# Patient Record
Sex: MALE | Race: WHITE | NOT HISPANIC OR LATINO | Employment: OTHER | URBAN - NONMETROPOLITAN AREA
[De-identification: names, ages, dates, MRNs, and addresses within clinical notes are randomized per-mention and may not be internally consistent; named-entity substitution may affect disease eponyms.]

---

## 2021-08-29 ENCOUNTER — OUTSIDE FACILITY SERVICE (OUTPATIENT)
Dept: CARDIOLOGY | Facility: CLINIC | Age: 41
End: 2021-08-29

## 2021-08-29 PROCEDURE — 93306 TTE W/DOPPLER COMPLETE: CPT | Performed by: INTERNAL MEDICINE

## 2021-11-10 PROBLEM — I25.10 CORONARY ARTERY DISEASE DUE TO CALCIFIED CORONARY LESION: Status: ACTIVE | Noted: 2021-11-10

## 2021-11-10 PROBLEM — R07.2 PRECORDIAL PAIN: Status: ACTIVE | Noted: 2021-11-10

## 2021-11-10 PROBLEM — I47.20 V-TACH (HCC): Status: ACTIVE | Noted: 2021-11-10

## 2021-11-10 PROBLEM — F17.200 SMOKER: Status: ACTIVE | Noted: 2021-11-10

## 2021-11-10 PROBLEM — I21.3 STEMI (ST ELEVATION MYOCARDIAL INFARCTION) (HCC): Status: ACTIVE | Noted: 2021-11-10

## 2021-11-10 PROBLEM — I25.84 CORONARY ARTERY DISEASE DUE TO CALCIFIED CORONARY LESION: Status: ACTIVE | Noted: 2021-11-10

## 2021-11-10 RX ORDER — METOPROLOL SUCCINATE 25 MG/1
25 TABLET, EXTENDED RELEASE ORAL DAILY
COMMUNITY
End: 2021-11-22 | Stop reason: SDUPTHER

## 2021-11-10 RX ORDER — LOSARTAN POTASSIUM 25 MG/1
25 TABLET ORAL DAILY
COMMUNITY
End: 2021-11-22 | Stop reason: SDUPTHER

## 2021-11-10 RX ORDER — BUPRENORPHINE HYDROCHLORIDE AND NALOXONE HYDROCHLORIDE DIHYDRATE 8; 2 MG/1; MG/1
1 TABLET SUBLINGUAL DAILY
COMMUNITY

## 2021-11-10 RX ORDER — ROSUVASTATIN CALCIUM 40 MG/1
40 TABLET, COATED ORAL DAILY
COMMUNITY
End: 2021-11-22 | Stop reason: SDUPTHER

## 2021-11-11 ENCOUNTER — OFFICE VISIT (OUTPATIENT)
Dept: CARDIOLOGY | Facility: CLINIC | Age: 41
End: 2021-11-11

## 2021-11-11 VITALS
BODY MASS INDEX: 23.13 KG/M2 | HEIGHT: 70 IN | DIASTOLIC BLOOD PRESSURE: 79 MMHG | SYSTOLIC BLOOD PRESSURE: 136 MMHG | OXYGEN SATURATION: 98 % | HEART RATE: 70 BPM | WEIGHT: 161.6 LBS

## 2021-11-11 DIAGNOSIS — R07.2 PRECORDIAL PAIN: ICD-10-CM

## 2021-11-11 DIAGNOSIS — I21.11 ST ELEVATION MYOCARDIAL INFARCTION INVOLVING RIGHT CORONARY ARTERY (HCC): ICD-10-CM

## 2021-11-11 DIAGNOSIS — I47.20 V-TACH (HCC): Primary | ICD-10-CM

## 2021-11-11 DIAGNOSIS — I25.84 CORONARY ARTERY DISEASE DUE TO CALCIFIED CORONARY LESION: ICD-10-CM

## 2021-11-11 DIAGNOSIS — Z87.891 FORMER SMOKER: ICD-10-CM

## 2021-11-11 DIAGNOSIS — E78.2 MIXED HYPERLIPIDEMIA: ICD-10-CM

## 2021-11-11 DIAGNOSIS — I25.10 CORONARY ARTERY DISEASE DUE TO CALCIFIED CORONARY LESION: ICD-10-CM

## 2021-11-11 PROBLEM — F17.200 SMOKER: Status: RESOLVED | Noted: 2021-11-10 | Resolved: 2021-11-11

## 2021-11-11 PROCEDURE — 99204 OFFICE O/P NEW MOD 45 MIN: CPT | Performed by: INTERNAL MEDICINE

## 2021-11-11 RX ORDER — NITROGLYCERIN 0.4 MG/1
TABLET SUBLINGUAL
COMMUNITY
Start: 2021-09-03

## 2021-11-22 ENCOUNTER — TELEPHONE (OUTPATIENT)
Dept: CARDIOLOGY | Facility: CLINIC | Age: 41
End: 2021-11-22

## 2021-11-22 DIAGNOSIS — I47.20 V-TACH (HCC): Primary | ICD-10-CM

## 2021-11-22 RX ORDER — LOSARTAN POTASSIUM 25 MG/1
25 TABLET ORAL DAILY
Qty: 30 TABLET | Refills: 5 | Status: SHIPPED | OUTPATIENT
Start: 2021-11-22 | End: 2022-05-16

## 2021-11-22 RX ORDER — METOPROLOL SUCCINATE 25 MG/1
25 TABLET, EXTENDED RELEASE ORAL DAILY
Qty: 30 TABLET | Refills: 5 | Status: SHIPPED | OUTPATIENT
Start: 2021-11-22 | End: 2021-11-22 | Stop reason: SDUPTHER

## 2021-11-22 RX ORDER — METOPROLOL SUCCINATE 25 MG/1
25 TABLET, EXTENDED RELEASE ORAL DAILY
Qty: 30 TABLET | Refills: 5 | Status: SHIPPED | OUTPATIENT
Start: 2021-11-22 | End: 2022-05-16

## 2021-11-22 RX ORDER — ROSUVASTATIN CALCIUM 40 MG/1
40 TABLET, COATED ORAL DAILY
Qty: 30 TABLET | Refills: 5 | Status: SHIPPED | OUTPATIENT
Start: 2021-11-22 | End: 2022-05-16

## 2021-11-22 NOTE — TELEPHONE ENCOUNTER
Event monitor from Dr. Arce's office reviewed by Dr. Ba. Long episode of NSVT. V/o per Dr. Ba to increase Metoprolol succ. From 12.5 mg daily to 25 mg daily and keep f/u appt. Called and discussed above with Mr. Reich and verbalized he understood. [facility]. PH,LPN

## 2021-11-22 NOTE — TELEPHONE ENCOUNTER
Pt called needing rf on all meds (Cardiac)  Pt was initially prescribed these from another doctor. Are these okay to refill?

## 2021-12-14 ENCOUNTER — OFFICE VISIT (OUTPATIENT)
Dept: CARDIOLOGY | Facility: CLINIC | Age: 41
End: 2021-12-14

## 2021-12-14 VITALS
BODY MASS INDEX: 23.22 KG/M2 | SYSTOLIC BLOOD PRESSURE: 125 MMHG | HEIGHT: 70 IN | WEIGHT: 162.2 LBS | HEART RATE: 58 BPM | OXYGEN SATURATION: 100 % | DIASTOLIC BLOOD PRESSURE: 78 MMHG

## 2021-12-14 DIAGNOSIS — R07.2 PRECORDIAL PAIN: ICD-10-CM

## 2021-12-14 DIAGNOSIS — R00.2 PALPITATIONS: ICD-10-CM

## 2021-12-14 DIAGNOSIS — I25.84 CORONARY ARTERY DISEASE DUE TO CALCIFIED CORONARY LESION: ICD-10-CM

## 2021-12-14 DIAGNOSIS — I21.11 ST ELEVATION MYOCARDIAL INFARCTION INVOLVING RIGHT CORONARY ARTERY (HCC): ICD-10-CM

## 2021-12-14 DIAGNOSIS — Z87.891 FORMER SMOKER: ICD-10-CM

## 2021-12-14 DIAGNOSIS — Z72.0 SMOKELESS TOBACCO USE: ICD-10-CM

## 2021-12-14 DIAGNOSIS — I25.10 CORONARY ARTERY DISEASE DUE TO CALCIFIED CORONARY LESION: ICD-10-CM

## 2021-12-14 DIAGNOSIS — I47.20 V-TACH (HCC): Primary | ICD-10-CM

## 2021-12-14 PROCEDURE — 99213 OFFICE O/P EST LOW 20 MIN: CPT | Performed by: INTERNAL MEDICINE

## 2021-12-14 NOTE — PROGRESS NOTES
Subjective   Hilario Reich is a 41 y.o. male     Chief Complaint   Patient presents with   • Follow-up     Here for 1 mo. f/u and ER f/u   • Chest Pain   • Coronary Artery Disease   • Rapid Heart Rate     HX VT       PROBLEM LIST:     1. CAD  1.1 [facility], 8-, setting of STEMI. Thrombectomy and 3.5 x 22 mm HASEEB placed to mid RCA  2. V-Tach per above hosp. Stay. Requiring Lidocaine drip  3. Echo, 8-, EF 50%, trace-mild MR, mild PI  4. Hyperlipidemia  5. Former smoker    Specialty Problems        Cardiology Problems    Coronary artery disease due to calcified coronary lesion        STEMI (ST elevation myocardial infarction) (HCC)        V-tach ([facility])                HPI:  Harvey Reich returns for follow-up.    Shortly after last being seen here he had an episode of palpitations, a sense of waves of weakness and lightheadedness, and mild chest discomfort.  He was seen in the emergency room at [facility] where he ruled out for myocardial infarction with serial enzymes and EKGs.  Potassium was 3.2 at that time.  The patient was given potassium supplementation and he has had no recurrence of symptoms.    Currently Mr. Reich is able to perform all of his required and desired activities without limitation.  In fact, he states that he has less shortness of breath and less fatigability in comparison to his functional capacity prior to myocardial infarction.  He has continued to abstain from cigarettes.  Lipids are well controlled total cholesterol is 100 HDL is 43 LDL is 42 triglycerides are 100.  Mr. Reich had minimally elevated liver enzymes with an checked in the emergency room.                      PRIOR MEDICATIONS    Current Outpatient Medications on File Prior to Visit   Medication Sig Dispense Refill   • Aspirin 81 MG capsule Take 81 mg by mouth Daily. 30 capsule 5   • buprenorphine-naloxone (SUBOXONE) 8-2 MG per SL tablet Place 1 tablet under the tongue Daily.     • losartan (COZAAR) 25  MG tablet Take 1 tablet by mouth Daily. 30 tablet 5   • metoprolol succinate XL (TOPROL-XL) 25 MG 24 hr tablet Take 1 tablet by mouth Daily. (Patient taking differently: Take 12.5 mg by mouth Daily.) 30 tablet 5   • nitroglycerin (NITROSTAT) 0.4 MG SL tablet prn     • rosuvastatin (CRESTOR) 40 MG tablet Take 1 tablet by mouth Daily. 30 tablet 5   • ticagrelor (BRILINTA) 90 MG tablet tablet Take 1 tablet by mouth 2 (Two) Times a Day. 60 tablet 5     No current facility-administered medications on file prior to visit.       ALLERGIES:    Patient has no known allergies.    PAST MEDICAL HISTORY:    Past Medical History:   Diagnosis Date   • CAD (coronary artery disease)    • Chest pain    • Former smoker    • Hyperlipidemia    • STEMI (ST elevation myocardial infarction) ([facility])     STEMI 8-2021   • V-tach ([facility])        SURGICAL HISTORY:    Past Surgical History:   Procedure Laterality Date   • CARDIAC CATHETERIZATION     • CORONARY ANGIOPLASTY WITH STENT PLACEMENT      HASEEB x 1 RCA, 8-       SOCIAL HISTORY:    Social History     Socioeconomic History   • Marital status:    Tobacco Use   • Smoking status: Former Smoker     Types: Cigarettes   • Smokeless tobacco: Current User   • Tobacco comment: used to smoke approx. 1.5 PPD for approx. 15-20 yrs.    Substance and Sexual Activity   • Alcohol use: Not Currently     Comment: in past   • Drug use: Never       FAMILY HISTORY:    Family History   Problem Relation Age of Onset   • Hypothyroidism Mother    • Hypertension Father    • Hyperlipidemia Father        Review of Systems   HENT: Negative.    Eyes: Negative.    Respiratory: Negative.         Denies orthopnea/PND   Cardiovascular: Positive for chest pain (ER visit on 11-17, same type pain as before but less intense. ) and palpitations. Negative for leg swelling.   Gastrointestinal: Positive for constipation.   Endocrine: Negative.    Genitourinary: Negative.    Musculoskeletal: Negative.    Skin: Negative.   "  Allergic/Immunologic: Negative.    Neurological: Positive for weakness (\"wave\" episodes) and light-headedness (occas. with standing up quickly). Negative for dizziness and syncope.   Hematological: Bruises/bleeds easily.   Psychiatric/Behavioral: Negative.        VISIT VITALS:  Vitals:    12/14/21 0911   BP: 125/78   BP Location: Left arm   Patient Position: Sitting   Pulse: 58   SpO2: 100%   Weight: 73.6 kg (162 lb 3.2 oz)   Height: 177.8 cm (70\")      /78 (BP Location: Left arm, Patient Position: Sitting)   Pulse 58   Ht 177.8 cm (70\")   Wt 73.6 kg (162 lb 3.2 oz)   SpO2 100%   BMI 23.27 kg/m²     RECENT LABS:    Objective       Physical Exam    Procedures      Assessment/Plan   #1.  Coronary artery disease.  The patient had a single episode of chest discomfort and ruled out for myocardial infarction as described above.  I think it is very likely that Mr. Reich suffered a rhythm abnormality he is done symptoms as described under history of present illness and his known nonsustained ventricular tachycardia.  However, with reproduction of chest pain similar to that he suffered with myocardial infarction I think that we need to repeat stress testing for more definitive assessment of risk.    2.  Nonsustained ventricular tachycardia post STEMI and PCI the right coronary artery.  LV systolic function has been preserved Mr. Reich has not been syncopal.  However, on review of records from Dr. Arce's office Mr. Reich was having episodes of monomorphic ventricular tachycardia with rates approaching 200.  Therefore, I would like to repeat event monitoring.    3.  Treated and controlled dyslipidemia.    4.  Normally elevated liver enzymes.  This is likely due to rosuvastatin as the patient had normal liver enzymes to that medication.  We will continue to monitor.    5.  I did detailed discussion with the patient today about the need to report to the emergency room for any recurrence of symptoms and that he " continue to take medications as prescribed as he has since the time of his MI.  We will plan on seeing Mr. Reich in follow-up after testing and he will follow with Nury Kwok as instructed.   Diagnosis Plan   1. V-tach (HCC)     2. ST elevation myocardial infarction involving right coronary artery (HCC)     3. Precordial pain     4. Coronary artery disease due to calcified coronary lesion     5. Former smoker     6. Smokeless tobacco use         No follow-ups on file.         Hilario eRich  reports that he has quit smoking. His smoking use included cigarettes. He uses smokeless tobacco.. I have educated him on the risk of diseases from using tobacco products such as cancer, COPD and heart disease.     I advised him to quit and he is not willing to quit.    I spent 3  minutes counseling the patient.          Patient's Body mass index is 23.27 kg/m². indicating that he is within normal range (BMI 18.5-24.9). No BMI management plan needed..             Electronically signed by:    Scribed for Elder Ba MD by Kim Dougherty LPN on December 14, 2021  at 09:15 EST    I, Elder Ba MD personally performed the services described in this documentation as scribed by the above named individual in my presence, and it is both accurate and complete. December 14, 2021 09:15 EST      This note is dictated utilizing voice recognition software.  Although this record has been proof read, transcriptional errors may still be present. If questions occur regarding the content of this record please do not hesitate to call our office.

## 2021-12-15 ENCOUNTER — TELEPHONE (OUTPATIENT)
Dept: CARDIOLOGY | Facility: CLINIC | Age: 41
End: 2021-12-15

## 2021-12-15 NOTE — TELEPHONE ENCOUNTER
Patient called wanted to let us know that he started Lexapro and it made him have hot flashed . BP was 110/80 last night and today 109/59. Feeld better today Stopped Lexapro and put a call into his PCP.    LESLEY JAIME

## 2021-12-28 ENCOUNTER — HOSPITAL ENCOUNTER (OUTPATIENT)
Dept: CARDIOLOGY | Facility: HOSPITAL | Age: 41
Discharge: HOME | End: 2021-12-28

## 2021-12-28 DIAGNOSIS — Z87.891 FORMER SMOKER: ICD-10-CM

## 2021-12-28 DIAGNOSIS — I25.10 CORONARY ARTERY DISEASE DUE TO CALCIFIED CORONARY LESION: ICD-10-CM

## 2021-12-28 DIAGNOSIS — I21.11 ST ELEVATION MYOCARDIAL INFARCTION INVOLVING RIGHT CORONARY ARTERY (HCC): ICD-10-CM

## 2021-12-28 DIAGNOSIS — I25.84 CORONARY ARTERY DISEASE DUE TO CALCIFIED CORONARY LESION: ICD-10-CM

## 2021-12-28 DIAGNOSIS — I47.20 V-TACH (HCC): ICD-10-CM

## 2021-12-28 DIAGNOSIS — R07.2 PRECORDIAL PAIN: ICD-10-CM

## 2021-12-28 DIAGNOSIS — Z72.0 SMOKELESS TOBACCO USE: ICD-10-CM

## 2021-12-28 PROCEDURE — 93017 CV STRESS TEST TRACING ONLY: CPT

## 2021-12-28 PROCEDURE — 78452 HT MUSCLE IMAGE SPECT MULT: CPT

## 2021-12-28 PROCEDURE — A9500 TC99M SESTAMIBI: HCPCS | Performed by: INTERNAL MEDICINE

## 2021-12-28 PROCEDURE — 93018 CV STRESS TEST I&R ONLY: CPT | Performed by: INTERNAL MEDICINE

## 2021-12-28 PROCEDURE — 0 TECHNETIUM SESTAMIBI: Performed by: INTERNAL MEDICINE

## 2021-12-28 PROCEDURE — 78452 HT MUSCLE IMAGE SPECT MULT: CPT | Performed by: INTERNAL MEDICINE

## 2021-12-28 RX ADMIN — TECHNETIUM TC 99M SESTAMIBI 1 DOSE: 1 INJECTION INTRAVENOUS at 09:44

## 2021-12-28 RX ADMIN — TECHNETIUM TC 99M SESTAMIBI 1 DOSE: 1 INJECTION INTRAVENOUS at 13:45

## 2022-01-05 LAB
BH CV REST NUCLEAR ISOTOPE DOSE: 10 MCI
BH CV STRESS NUCLEAR ISOTOPE DOSE: 30 MCI
BH CV STRESS RECOVERY BP: NORMAL MMHG
BH CV STRESS RECOVERY HR: 85 BPM
MAXIMAL PREDICTED HEART RATE: 178 BPM
PERCENT MAX PREDICTED HR: 93.82 %
STRESS BASELINE BP: NORMAL MMHG
STRESS BASELINE HR: 58 BPM
STRESS PERCENT HR: 110 %
STRESS POST ESTIMATED WORKLOAD: 10.1 METS
STRESS POST EXERCISE DUR MIN: 7 MIN
STRESS POST EXERCISE DUR SEC: 55 SEC
STRESS POST PEAK BP: NORMAL MMHG
STRESS POST PEAK HR: 167 BPM
STRESS TARGET HR: 151 BPM

## 2022-01-06 ENCOUNTER — TELEPHONE (OUTPATIENT)
Dept: CARDIOLOGY | Facility: CLINIC | Age: 42
End: 2022-01-06

## 2022-01-06 NOTE — TELEPHONE ENCOUNTER
ATTEMPTED TO CALL STRESS TEST RESULTS, BUT N O ANSWER. PATIENT HAS A F/U APPT. NEXT MONTH. CONRADO DOWELL          ----- Message from Elder Ba MD sent at 1/6/2022 12:41 PM EST -----  Routine f/u.

## 2022-01-13 ENCOUNTER — TELEPHONE (OUTPATIENT)
Dept: CARDIOLOGY | Facility: CLINIC | Age: 42
End: 2022-01-13

## 2022-01-13 NOTE — TELEPHONE ENCOUNTER
Patient  Called wanting test results for stress test. Went over results patient will keep follow up apt.

## 2022-01-25 ENCOUNTER — PRIOR AUTHORIZATION (OUTPATIENT)
Dept: CARDIOLOGY | Facility: CLINIC | Age: 42
End: 2022-01-25

## 2022-01-25 RX ORDER — PRASUGREL 10 MG/1
10 TABLET, FILM COATED ORAL DAILY
Qty: 30 TABLET | Refills: 11 | Status: SHIPPED | OUTPATIENT
Start: 2022-01-25 | End: 2022-12-20

## 2022-01-25 NOTE — TELEPHONE ENCOUNTER
Patient's insurance denied Brilinta due to not having tried and failed Plavix or Effient. Verbal order per Dr Ba for Effient if patient has no history of stroke.   Patient states no history of stroke. Daniella Hammonds MA

## 2022-02-21 ENCOUNTER — OFFICE VISIT (OUTPATIENT)
Dept: CARDIOLOGY | Facility: CLINIC | Age: 42
End: 2022-02-21

## 2022-02-21 VITALS
HEART RATE: 81 BPM | SYSTOLIC BLOOD PRESSURE: 131 MMHG | WEIGHT: 163.6 LBS | OXYGEN SATURATION: 98 % | DIASTOLIC BLOOD PRESSURE: 80 MMHG | BODY MASS INDEX: 23.42 KG/M2 | HEIGHT: 70 IN

## 2022-02-21 DIAGNOSIS — I25.10 CORONARY ARTERY DISEASE INVOLVING NATIVE CORONARY ARTERY OF NATIVE HEART WITHOUT ANGINA PECTORIS: Primary | ICD-10-CM

## 2022-02-21 DIAGNOSIS — I25.5 ISCHEMIC CARDIOMYOPATHY: ICD-10-CM

## 2022-02-21 DIAGNOSIS — R06.02 SHORTNESS OF BREATH: ICD-10-CM

## 2022-02-21 PROCEDURE — 99213 OFFICE O/P EST LOW 20 MIN: CPT | Performed by: PHYSICIAN ASSISTANT

## 2022-02-21 NOTE — PROGRESS NOTES
Problem list     Subjective   Hilario Reich is a 41 y.o. male     Chief Complaint   Patient presents with   • Follow-up     4 month / testing    Problem list:  1.  Coronary artery disease.  1.  ST elevation MI with subsequent thrombectomy and stenting to the RCA, 8/2021.  2.  Ischemic cardiomyopathy at time of infarct as above, EF 40 to 45%.  3.  V. tach during ST elevation MI requiring lidocaine, but no residual dysrhythmic or recurrent dysrhythmic activity is noted after discharge.  4.  Hypertension  5.  Dyslipidemia  6.  History of smoking habituation.    HPI    The patient presents back to the clinic today for review.  He was scheduled for testing earlier last month.  Stress test indicated no evidence of ischemia with a post stress EF at 60%.  Monitor indicated sinus rhythm with short episodes of nonsustained V. tach which was asymptomatic, and no significant dysrhythmic activity otherwise.  Clinically, the patient is now doing well.  He has no chest pain.  He has stable dyspnea.  He has no failure nor dysrhythmic symptoms.  The patient has no further complaints and feels that overall, he feels as good now as he has in some time.  Current Outpatient Medications on File Prior to Visit   Medication Sig Dispense Refill   • Aspirin 81 MG capsule Take 81 mg by mouth Daily. 30 capsule 5   • buprenorphine-naloxone (SUBOXONE) 8-2 MG per SL tablet Place 1 tablet under the tongue Daily.     • losartan (COZAAR) 25 MG tablet Take 1 tablet by mouth Daily. 30 tablet 5   • metoprolol succinate XL (TOPROL-XL) 25 MG 24 hr tablet Take 1 tablet by mouth Daily. (Patient taking differently: Take 12.5 mg by mouth Daily.) 30 tablet 5   • nitroglycerin (NITROSTAT) 0.4 MG SL tablet prn     • prasugrel (EFFIENT) 10 MG tablet Take 1 tablet by mouth Daily. 30 tablet 11   • rosuvastatin (CRESTOR) 40 MG tablet Take 1 tablet by mouth Daily. 30 tablet 5     No current facility-administered medications on file prior to visit.       Patient has  "no known allergies.    Past Medical History:   Diagnosis Date   • CAD (coronary artery disease)    • Chest pain    • Former smoker    • Hyperlipidemia    • STEMI (ST elevation myocardial infarction) ([facility])     STEMI 8-2021   • V-tach ([facility])        Social History     Socioeconomic History   • Marital status:    Tobacco Use   • Smoking status: Former Smoker     Types: Cigarettes   • Smokeless tobacco: Current User   • Tobacco comment: used to smoke approx. 1.5 PPD for approx. 15-20 yrs.    Substance and Sexual Activity   • Alcohol use: Not Currently     Comment: in past   • Drug use: Never       Family History   Problem Relation Age of Onset   • Hypothyroidism Mother    • Hypertension Father    • Hyperlipidemia Father        Review of Systems   Constitutional: Negative.  Negative for chills, fatigue and fever.   HENT: Negative for congestion, rhinorrhea and sore throat.    Eyes: Negative.  Negative for visual disturbance.   Respiratory: Negative.  Negative for chest tightness, shortness of breath and wheezing.    Cardiovascular: Positive for palpitations. Negative for chest pain and leg swelling.   Gastrointestinal: Negative.    Endocrine: Negative.    Genitourinary: Negative.    Musculoskeletal: Positive for back pain. Negative for arthralgias and neck pain.   Skin: Negative.  Negative for rash and wound.   Allergic/Immunologic: Positive for environmental allergies.   Neurological: Negative.  Negative for dizziness, weakness, numbness and headaches.   Hematological: Bruises/bleeds easily (bruises).   Psychiatric/Behavioral: Negative.  Negative for sleep disturbance.       Objective   Vitals:    02/21/22 1542   BP: 131/80   BP Location: Left arm   Patient Position: Sitting   Pulse: 81   SpO2: 98%   Weight: 74.2 kg (163 lb 9.6 oz)   Height: 177.8 cm (70\")      /80 (BP Location: Left arm, Patient Position: Sitting)   Pulse 81   Ht 177.8 cm (70\")   Wt 74.2 kg (163 lb 9.6 oz)   SpO2 98%   BMI 23.47 kg/m²  "   Lab Results (most recent)     None        Physical Exam  Vitals and nursing note reviewed.   Constitutional:       General: He is not in acute distress.     Appearance: He is well-developed.   HENT:      Head: Normocephalic and atraumatic.   Eyes:      Conjunctiva/sclera: Conjunctivae normal.      Pupils: Pupils are equal, round, and reactive to light.   Neck:      Vascular: No JVD.      Trachea: No tracheal deviation.   Cardiovascular:      Rate and Rhythm: Normal rate and regular rhythm.      Heart sounds: Normal heart sounds.   Pulmonary:      Effort: Pulmonary effort is normal.      Breath sounds: Normal breath sounds.   Abdominal:      General: Bowel sounds are normal. There is no distension.      Palpations: Abdomen is soft. There is no mass.      Tenderness: There is no abdominal tenderness. There is no guarding or rebound.   Musculoskeletal:         General: No tenderness or deformity. Normal range of motion.      Cervical back: Normal range of motion and neck supple.   Skin:     General: Skin is warm and dry.      Coloration: Skin is not pale.      Findings: No erythema or rash.   Neurological:      Mental Status: He is alert and oriented to person, place, and time.   Psychiatric:         Behavior: Behavior normal.         Thought Content: Thought content normal.         Judgment: Judgment normal.           Procedure   Procedures       Assessment/Plan      Diagnosis Plan   1. Coronary artery disease involving native coronary artery of native heart without angina pectoris  Adult Transthoracic Echo Limited W/ Cont if Necessary Per Protocol   2. Ischemic cardiomyopathy  Adult Transthoracic Echo Limited W/ Cont if Necessary Per Protocol   3. Shortness of breath       1.  The recent stress and monitor studies have been largely benign, all as outlined above.  I would continue low-dose beta-blocker therapy given ventricular ectopy noted by monitor findings.    2.  Stress test again indicates no evidence of  ischemia.  The patient feels well and reports stable dyspnea with no significant cardiac issues otherwise.  I do not feel anything further is indicated.    3.  We will continue medications for now without change.  I would like to repeat a limited echo as EF has been estimated previously between 40 to 50%.  I also recognize that recent stress test suggested that his EF had now normalized.  This was by nuclear study however and I feel that we need a limited echo to evaluate that further.    Of 4.  Mostly, the patient appears very much stable from general cardiovascular standpoint.  We will make no adjustments.  If echo supports now normalized systolic function, nothing further will be indicated and we would see the patient on routine 6-month intervals.                        Electronically signed by:

## 2022-02-21 NOTE — PATIENT INSTRUCTIONS

## 2022-03-29 ENCOUNTER — HOSPITAL ENCOUNTER (OUTPATIENT)
Dept: CARDIOLOGY | Facility: HOSPITAL | Age: 42
Discharge: HOME | End: 2022-03-29
Admitting: PHYSICIAN ASSISTANT

## 2022-03-29 VITALS — WEIGHT: 163.58 LBS | HEIGHT: 70 IN | BODY MASS INDEX: 23.42 KG/M2

## 2022-03-29 DIAGNOSIS — I25.10 CORONARY ARTERY DISEASE INVOLVING NATIVE CORONARY ARTERY OF NATIVE HEART WITHOUT ANGINA PECTORIS: ICD-10-CM

## 2022-03-29 DIAGNOSIS — I25.5 ISCHEMIC CARDIOMYOPATHY: ICD-10-CM

## 2022-03-29 PROCEDURE — 93308 TTE F-UP OR LMTD: CPT | Performed by: INTERNAL MEDICINE

## 2022-03-29 PROCEDURE — 93308 TTE F-UP OR LMTD: CPT

## 2022-04-10 LAB
BH CV ECHO MEAS - ACS: 1.52 CM
BH CV ECHO MEAS - AO ROOT DIAM: 2.8 CM
BH CV ECHO MEAS - EDV(CUBED): 95.9 ML
BH CV ECHO MEAS - EDV(MOD-SP4): 76.9 ML
BH CV ECHO MEAS - EF(MOD-SP4): 64.2 %
BH CV ECHO MEAS - EF_3D-VOL: 55 %
BH CV ECHO MEAS - ESV(CUBED): 28.8 ML
BH CV ECHO MEAS - ESV(MOD-SP4): 27.5 ML
BH CV ECHO MEAS - FS: 33 %
BH CV ECHO MEAS - IVS/LVPW: 0.97 CM
BH CV ECHO MEAS - IVSD: 0.85 CM
BH CV ECHO MEAS - LA DIMENSION: 3.3 CM
BH CV ECHO MEAS - LV DIASTOLIC VOL/BSA (35-75): 40.2 CM2
BH CV ECHO MEAS - LV MASS(C)D: 129.8 GRAMS
BH CV ECHO MEAS - LV SYSTOLIC VOL/BSA (12-30): 14.4 CM2
BH CV ECHO MEAS - LVIDD: 4.6 CM
BH CV ECHO MEAS - LVIDS: 3.1 CM
BH CV ECHO MEAS - LVPWD: 0.88 CM
BH CV ECHO MEAS - RVDD: 2.9 CM
BH CV ECHO MEAS - SI(MOD-SP4): 25.8 ML/M2
BH CV ECHO MEAS - SV(MOD-SP4): 49.4 ML
MAXIMAL PREDICTED HEART RATE: 178 BPM
SINUS: 2.7 CM
STRESS TARGET HR: 151 BPM

## 2022-04-11 ENCOUNTER — TELEPHONE (OUTPATIENT)
Dept: CARDIOLOGY | Facility: CLINIC | Age: 42
End: 2022-04-11

## 2022-04-11 NOTE — TELEPHONE ENCOUNTER
ECHO  Pt notified of no acute findings. Provider will discuss results at f/u. Pt reminded of appt date and time.    ----- Message from Merlene Manuel MA sent at 4/11/2022  8:42 AM EDT -----    ----- Message -----  From: Sabas Reynolds PA  Sent: 4/11/2022   8:38 AM EDT  To: Merlene Manuel MA    Routine follow-up.

## 2022-05-13 DIAGNOSIS — I47.20 V-TACH: ICD-10-CM

## 2022-05-13 DIAGNOSIS — I25.5 ISCHEMIC CARDIOMYOPATHY: Primary | ICD-10-CM

## 2022-05-13 DIAGNOSIS — E78.2 MIXED HYPERLIPIDEMIA: ICD-10-CM

## 2022-05-16 RX ORDER — METOPROLOL SUCCINATE 25 MG/1
TABLET, EXTENDED RELEASE ORAL
Qty: 30 TABLET | Refills: 5 | Status: SHIPPED | OUTPATIENT
Start: 2022-05-16 | End: 2022-11-22

## 2022-05-16 RX ORDER — ASPIRIN 81 MG/1
TABLET ORAL
Qty: 30 TABLET | Refills: 5 | Status: SHIPPED | OUTPATIENT
Start: 2022-05-16 | End: 2022-10-25

## 2022-05-16 RX ORDER — LOSARTAN POTASSIUM 25 MG/1
25 TABLET ORAL DAILY
Qty: 30 TABLET | Refills: 5 | Status: SHIPPED | OUTPATIENT
Start: 2022-05-16 | End: 2022-10-25

## 2022-05-16 RX ORDER — ROSUVASTATIN CALCIUM 40 MG/1
TABLET, COATED ORAL
Qty: 30 TABLET | Refills: 5 | Status: SHIPPED | OUTPATIENT
Start: 2022-05-16 | End: 2022-10-25

## 2022-06-08 ENCOUNTER — TELEPHONE (OUTPATIENT)
Dept: CARDIOLOGY | Facility: CLINIC | Age: 42
End: 2022-06-08

## 2022-06-08 RX ORDER — BUSPIRONE HYDROCHLORIDE 5 MG/1
5 TABLET ORAL 2 TIMES DAILY
COMMUNITY

## 2022-06-08 RX ORDER — DULOXETIN HYDROCHLORIDE 20 MG/1
20 CAPSULE, DELAYED RELEASE ORAL DAILY
COMMUNITY

## 2022-06-08 NOTE — TELEPHONE ENCOUNTER
"Patient called and states he noticed his heart was out of rhythm, x 2 weeks prior. Felt very tired, felt fluttering and it lasted for a longer period of time than normal, symptoms are barely noticeable, and PCP wanted cardiology to be aware.    Seen PCP after occurrence, and states EKG showed okay, PCP thought could of poss been \"due to being on vacation and eating bad, could be the cause.\"     Patient denies any chest pain, he is aware any new or worsening sx go to ER.    Cymbalta 20mg from PCP.  Buspar 5 mg bid as needed.  Per PCP added to list.  "

## 2022-06-08 NOTE — TELEPHONE ENCOUNTER
Per Sabas Reynolds,PAC verbal    Monitor Sx in the meantime, call back with anymore concerns or questions. Patient verbally understands.      Any new or worsening sx go to er.    JACOB PETERSON MA

## 2022-09-07 ENCOUNTER — OFFICE VISIT (OUTPATIENT)
Dept: CARDIOLOGY | Facility: CLINIC | Age: 42
End: 2022-09-07

## 2022-09-07 VITALS
OXYGEN SATURATION: 98 % | BODY MASS INDEX: 22.16 KG/M2 | HEART RATE: 76 BPM | HEIGHT: 70 IN | DIASTOLIC BLOOD PRESSURE: 78 MMHG | SYSTOLIC BLOOD PRESSURE: 137 MMHG | WEIGHT: 154.8 LBS

## 2022-09-07 DIAGNOSIS — I25.10 CORONARY ARTERY DISEASE INVOLVING NATIVE CORONARY ARTERY OF NATIVE HEART WITHOUT ANGINA PECTORIS: Primary | ICD-10-CM

## 2022-09-07 DIAGNOSIS — R06.02 SHORTNESS OF BREATH: ICD-10-CM

## 2022-09-07 DIAGNOSIS — I10 PRIMARY HYPERTENSION: ICD-10-CM

## 2022-09-07 PROCEDURE — 99213 OFFICE O/P EST LOW 20 MIN: CPT | Performed by: PHYSICIAN ASSISTANT

## 2022-09-07 NOTE — PROGRESS NOTES
Problem list     Subjective   Hilario Reich is a 41 y.o. male     Chief Complaint   Patient presents with   • Palpitations     ECHO F/U     Problem list:  1.  Coronary artery disease.  1.1.  ST elevation MI with subsequent thrombectomy and stenting to the RCA, 8/2021.  1.2.  Low risk stress test, 12/21, with no evidence of ischemia.  2.  Ischemic cardiomyopathy at time of infarct as above, EF 40 to 45%.  2.1.  Repeat echocardiogram, 3/2022, supported an EF of 50 to 55%.  3.  V. tach during ST elevation MI requiring lidocaine, but no residual dysrhythmic or recurrent dysrhythmic activity is noted after discharge.  4.  Hypertension  5.  Dyslipidemia  6.  History of smoking habituation.    HPI  The patient presents in the clinic today for routine evaluation and follow-up.  We did repeat an echo after last evaluation to evaluate given history of ischemic cardiomyopathy.  EF is now improved to 50 to 55%.  The patient reports that clinically he is doing well.  He does not have chest pain.  He has stable dyspnea.  He has no failure symptoms.  He no longer has palpitations or dysrhythmic symptoms otherwise.  He specifically denies any significant dizziness and never has syncope.  Exercise capacity is above average and without limitation whatsoever from cardiovascular standpoint.  Laboratories are followed closely with his primary care provider and felt to be normal by patient.  He has no further complaints at this time.    Current Outpatient Medications on File Prior to Visit   Medication Sig Dispense Refill   • Aspirin Adult Low Strength 81 MG EC tablet TAKE ONE TABLET BY MOUTH EVERY DAY 30 tablet 5   • buprenorphine-naloxone (SUBOXONE) 8-2 MG per SL tablet Place 1 tablet under the tongue Daily.     • busPIRone (BUSPAR) 5 MG tablet Take 5 mg by mouth 2 (Two) Times a Day. Use as needed     • DULoxetine (CYMBALTA) 20 MG capsule Take 20 mg by mouth Daily.     • losartan (COZAAR) 25 MG tablet TAKE 1 TABLET BY MOUTH DAILY. 30  tablet 5   • metoprolol succinate XL (TOPROL-XL) 25 MG 24 hr tablet TAKE ONE TABLET BY MOUTH EVERY DAY (Patient taking differently: 12.5 mg Daily.) 30 tablet 5   • nitroglycerin (NITROSTAT) 0.4 MG SL tablet prn     • prasugrel (EFFIENT) 10 MG tablet Take 1 tablet by mouth Daily. 30 tablet 11   • rosuvastatin (CRESTOR) 40 MG tablet TAKE ONE TABLET BY MOUTH EVERY DAY 30 tablet 5     No current facility-administered medications on file prior to visit.       Patient has no known allergies.    Past Medical History:   Diagnosis Date   • CAD (coronary artery disease)    • Chest pain    • Former smoker    • Hyperlipidemia    • STEMI (ST elevation myocardial infarction) ([facility])     STEMI 8-2021   • V-tach ([facility])        Social History     Socioeconomic History   • Marital status:    Tobacco Use   • Smoking status: Former Smoker     Types: Cigarettes   • Smokeless tobacco: Current User   • Tobacco comment: used to smoke approx. 1.5 PPD for approx. 15-20 yrs.    Substance and Sexual Activity   • Alcohol use: Not Currently     Comment: in past   • Drug use: Never       Family History   Problem Relation Age of Onset   • Hypothyroidism Mother    • Hypertension Father    • Hyperlipidemia Father        Review of Systems   Constitutional: Negative.  Negative for chills, diaphoresis, fatigue and fever.   HENT: Negative.    Eyes: Negative.    Respiratory: Negative.  Negative for apnea, cough, chest tightness, shortness of breath and wheezing.    Cardiovascular: Positive for palpitations (better than usual). Negative for chest pain and leg swelling.   Gastrointestinal: Positive for constipation. Negative for abdominal pain, blood in stool, diarrhea, nausea and vomiting.   Endocrine: Negative.    Genitourinary: Negative.  Negative for hematuria.   Musculoskeletal: Positive for back pain. Negative for arthralgias, myalgias and neck pain.   Skin: Negative.    Allergic/Immunologic: Negative.    Neurological: Positive for dizziness  "(quick movement). Negative for syncope, weakness, light-headedness, numbness and headaches.   Hematological: Bruises/bleeds easily.   Psychiatric/Behavioral: Negative.  Negative for sleep disturbance.       Objective   Vitals:    09/07/22 0839   BP: 137/78   BP Location: Left arm   Patient Position: Sitting   Pulse: 76   SpO2: 98%   Weight: 70.2 kg (154 lb 12.8 oz)   Height: 178 cm (70.08\")      /78 (BP Location: Left arm, Patient Position: Sitting)   Pulse 76   Ht 178 cm (70.08\")   Wt 70.2 kg (154 lb 12.8 oz)   SpO2 98%   BMI 22.16 kg/m²    Lab Results (most recent)     None        Physical Exam  Vitals and nursing note reviewed.   Constitutional:       General: He is not in acute distress.     Appearance: He is well-developed.   HENT:      Head: Normocephalic and atraumatic.   Eyes:      Conjunctiva/sclera: Conjunctivae normal.      Pupils: Pupils are equal, round, and reactive to light.   Neck:      Vascular: No JVD.      Trachea: No tracheal deviation.   Cardiovascular:      Rate and Rhythm: Normal rate and regular rhythm.      Heart sounds: Normal heart sounds.   Pulmonary:      Effort: Pulmonary effort is normal.      Breath sounds: Normal breath sounds.   Abdominal:      General: Bowel sounds are normal. There is no distension.      Palpations: Abdomen is soft. There is no mass.      Tenderness: There is no abdominal tenderness. There is no guarding or rebound.   Musculoskeletal:         General: No tenderness or deformity. Normal range of motion.      Cervical back: Normal range of motion and neck supple.   Skin:     General: Skin is warm and dry.      Coloration: Skin is not pale.      Findings: No erythema or rash.   Neurological:      Mental Status: He is alert and oriented to person, place, and time.   Psychiatric:         Behavior: Behavior normal.         Thought Content: Thought content normal.         Judgment: Judgment normal.           Procedure   Procedures       Assessment & Plan      " Diagnosis Plan   1. Coronary artery disease involving native coronary artery of native heart without angina pectoris     2. Shortness of breath     3. Primary hypertension       1.  At this time, the patient appears to be doing well from general cardiovascular standpoint.  Dyspnea remains at baseline and mostly nonproblematic by patient report.  He has no further cardiovascular symptoms or complications at this time.    2.  Echo supports improvement in systolic function, up from 40 to 45% at time of ST elevation MI as above, to now 50 to probably 55%.  I would continue cardioprotective medical regimen at this time.  We will continue to follow that long-term.    3.  Blood pressures remain very well treated and controlled when checked at home.  He will monitor that and call for any issues.    4.  I would continue medical regimen without change.  We will make no adjustments and continue to see the patient on routine 6-month intervals.           Hilario Reich  reports that he has quit smoking. His smoking use included cigarettes. He uses smokeless tobacco.. I have educated him on the risk of diseases from using tobacco products such as cancer, COPD and heart disease.     I advised him to quit and he is not willing to quit.    I spent 3  minutes counseling the patient.          BMI is within normal parameters. No other follow-up for BMI required.             Electronically signed by:

## 2022-10-25 DIAGNOSIS — E78.2 MIXED HYPERLIPIDEMIA: ICD-10-CM

## 2022-10-25 DIAGNOSIS — I25.5 ISCHEMIC CARDIOMYOPATHY: ICD-10-CM

## 2022-10-25 DIAGNOSIS — I47.20 V-TACH: ICD-10-CM

## 2022-10-25 RX ORDER — ROSUVASTATIN CALCIUM 40 MG/1
TABLET, COATED ORAL
Qty: 30 TABLET | Refills: 5 | Status: SHIPPED | OUTPATIENT
Start: 2022-10-25 | End: 2023-03-23

## 2022-10-25 RX ORDER — ASPIRIN 81 MG/1
TABLET ORAL
Qty: 30 TABLET | Refills: 5 | Status: SHIPPED | OUTPATIENT
Start: 2022-10-25 | End: 2023-03-23

## 2022-10-25 RX ORDER — LOSARTAN POTASSIUM 25 MG/1
TABLET ORAL
Qty: 30 TABLET | Refills: 5 | Status: SHIPPED | OUTPATIENT
Start: 2022-10-25 | End: 2023-03-23

## 2022-11-22 DIAGNOSIS — I47.20 V-TACH: ICD-10-CM

## 2022-11-22 RX ORDER — METOPROLOL SUCCINATE 25 MG/1
TABLET, EXTENDED RELEASE ORAL
Qty: 30 TABLET | Refills: 4 | Status: SHIPPED | OUTPATIENT
Start: 2022-11-22 | End: 2023-03-15 | Stop reason: DRUGHIGH

## 2022-12-20 RX ORDER — PRASUGREL 10 MG/1
TABLET, FILM COATED ORAL
Qty: 30 TABLET | Refills: 11 | Status: SHIPPED | OUTPATIENT
Start: 2022-12-20

## 2023-03-15 ENCOUNTER — OFFICE VISIT (OUTPATIENT)
Dept: CARDIOLOGY | Facility: CLINIC | Age: 43
End: 2023-03-15
Payer: COMMERCIAL

## 2023-03-15 VITALS
BODY MASS INDEX: 23.91 KG/M2 | HEIGHT: 70 IN | DIASTOLIC BLOOD PRESSURE: 74 MMHG | WEIGHT: 167 LBS | SYSTOLIC BLOOD PRESSURE: 110 MMHG | HEART RATE: 60 BPM

## 2023-03-15 DIAGNOSIS — I25.10 CORONARY ARTERY DISEASE INVOLVING NATIVE CORONARY ARTERY OF NATIVE HEART WITHOUT ANGINA PECTORIS: Primary | ICD-10-CM

## 2023-03-15 DIAGNOSIS — I10 PRIMARY HYPERTENSION: ICD-10-CM

## 2023-03-15 DIAGNOSIS — R06.02 SHORTNESS OF BREATH: ICD-10-CM

## 2023-03-15 PROCEDURE — 99213 OFFICE O/P EST LOW 20 MIN: CPT | Performed by: PHYSICIAN ASSISTANT

## 2023-03-15 PROCEDURE — 93000 ELECTROCARDIOGRAM COMPLETE: CPT | Performed by: PHYSICIAN ASSISTANT

## 2023-03-15 RX ORDER — METOPROLOL SUCCINATE 25 MG
25 TABLET, EXTENDED RELEASE 24 HR ORAL DAILY
COMMUNITY
End: 2023-03-23

## 2023-03-15 NOTE — PROGRESS NOTES
Subjective   Hilario Reich is a 42 y.o. male     Chief Complaint   Patient presents with   • Follow-up     Coronary artery disease   Problem list:  1.  Coronary artery disease.  1.1.  ST elevation MI with subsequent thrombectomy and stenting to the RCA, 8/2021.  1.2.  Low risk stress test, 12/21, with no evidence of ischemia.  2.  Ischemic cardiomyopathy at time of infarct as above, EF 40 to 45%.  2.1.  Repeat echocardiogram, 3/2022, supported an EF of 50 to 55%.  3.  V. tach during ST elevation MI requiring lidocaine, but no residual dysrhythmic or recurrent dysrhythmic activity is noted after discharge.  4.  Hypertension  5.  Dyslipidemia  6.  History of smoking habituation.    HPI    The patient presents in the clinic today for routine follow-up.  He carries cardiovascular history as outlined above.  He did have testing approximately 1 year ago.  Echo indicates now normalized systolic function.  Stress test at that time indicated no evidence of ischemia.  Currently, the patient is doing well.  He denies chest pain.  He has stable dyspnea.  He has no failure nor dysrhythmic symptoms.  He exerts at fairly high levels without limitation from cardiovascular standpoint.  He typically is normotensive when checked.  He is tolerating current medications without complication.  The patient has no further complaints otherwise and feels that he is doing well.    Current Outpatient Medications   Medication Sig Dispense Refill   • Aspirin Adult Low Strength 81 MG EC tablet TAKE ONE TABLET BY MOUTH EVERY DAY 30 tablet 5   • buprenorphine-naloxone (SUBOXONE) 8-2 MG per SL tablet Place 1 tablet under the tongue Daily.     • busPIRone (BUSPAR) 5 MG tablet Take 1 tablet by mouth 2 (Two) Times a Day. Use as needed     • DULoxetine (CYMBALTA) 20 MG capsule Take 1 capsule by mouth Daily.     • losartan (COZAAR) 25 MG tablet TAKE ONE TABLET BY MOUTH EVERY DAY 30 tablet 5   • metoprolol succinate XL (TOPROL-XL) 12.5 MG 24 hr half tablet  Take 2 half tablet by mouth Daily.     • nitroglycerin (NITROSTAT) 0.4 MG SL tablet prn     • prasugrel (EFFIENT) 10 MG tablet TAKE ONE TABLET BY MOUTH EVERY DAY (DISCONTINUE BRILINTA) 30 tablet 11   • rosuvastatin (CRESTOR) 40 MG tablet TAKE ONE TABLET BY MOUTH EVERY DAY 30 tablet 5     No current facility-administered medications for this visit.       Patient has no known allergies.    Past Medical History:   Diagnosis Date   • CAD (coronary artery disease)    • Chest pain    • Former smoker    • Hyperlipidemia    • STEMI (ST elevation myocardial infarction) (Former[facility])     STEMI 8-2021   • V-tach        Social History     Socioeconomic History   • Marital status:    Tobacco Use   • Smoking status: Former     Types: Cigarettes   • Smokeless tobacco: Current   • Tobacco comments:     used to smoke approx. 1.5 PPD for approx. 15-20 yrs.    Substance and Sexual Activity   • Alcohol use: Not Currently     Comment: in past   • Drug use: Never       Family History   Problem Relation Age of Onset   • Hypothyroidism Mother    • Hypertension Father    • Hyperlipidemia Father        Review of Systems   Constitutional: Negative.  Negative for activity change, appetite change, chills, fatigue and fever.   HENT: Negative.  Negative for congestion.    Eyes: Negative.  Negative for visual disturbance.   Respiratory: Negative.  Negative for apnea, cough, chest tightness, shortness of breath and wheezing.    Cardiovascular: Positive for palpitations. Negative for chest pain and leg swelling.   Gastrointestinal: Negative.  Negative for blood in stool.   Endocrine: Negative.  Negative for cold intolerance and heat intolerance.   Genitourinary: Negative.  Negative for hematuria.   Musculoskeletal: Positive for back pain. Negative for arthralgias, gait problem, joint swelling, neck pain and neck stiffness.   Skin: Negative.  Negative for color change, rash and wound.   Allergic/Immunologic: Negative.  Negative for environmental  "allergies and food allergies.   Neurological: Negative.  Negative for dizziness, syncope, weakness, light-headedness, numbness and headaches.   Hematological: Bruises/bleeds easily.   Psychiatric/Behavioral: Negative.  Negative for sleep disturbance.       Objective     Vitals:    03/15/23 0922   BP: 110/74   BP Location: Left arm   Patient Position: Sitting   Cuff Size: Adult   Pulse: 60   Weight: 75.8 kg (167 lb)   Height: 177.8 cm (70\")        /74 (BP Location: Left arm, Patient Position: Sitting, Cuff Size: Adult)   Pulse 60   Ht 177.8 cm (70\")   Wt 75.8 kg (167 lb)   BMI 23.96 kg/m²      Lab Results (most recent)     None          Physical Exam  Vitals and nursing note reviewed.   Constitutional:       General: He is not in acute distress.     Appearance: He is well-developed.   HENT:      Head: Normocephalic and atraumatic.   Eyes:      Conjunctiva/sclera: Conjunctivae normal.      Pupils: Pupils are equal, round, and reactive to light.   Neck:      Vascular: No JVD.      Trachea: No tracheal deviation.   Cardiovascular:      Rate and Rhythm: Normal rate and regular rhythm.      Heart sounds: Normal heart sounds.   Pulmonary:      Effort: Pulmonary effort is normal.      Breath sounds: Normal breath sounds.   Abdominal:      General: Bowel sounds are normal. There is no distension.      Palpations: Abdomen is soft. There is no mass.      Tenderness: There is no abdominal tenderness. There is no guarding or rebound.   Musculoskeletal:         General: No tenderness or deformity. Normal range of motion.      Cervical back: Normal range of motion and neck supple.   Skin:     General: Skin is warm and dry.      Coloration: Skin is not pale.      Findings: No erythema or rash.   Neurological:      Mental Status: He is alert and oriented to person, place, and time.   Psychiatric:         Behavior: Behavior normal.         Thought Content: Thought content normal.         Judgment: Judgment normal. "         Procedure     ECG 12 Lead    Date/Time: 3/15/2023 9:09 AM  Performed by: Sabas Reynolds PA  Authorized by: Sabas Reynolds PA   Comparison: compared with previous ECG from 11/17/2021  Comparison to previous ECG: Sinus rhythm, rate 65, indeterminate axis, no acute changes noted                   Assessment & Plan      Diagnosis Plan   1. Coronary artery disease involving native coronary artery of native heart without angina pectoris        2. Shortness of breath        3. Primary hypertension            1.  At this time, the patient is doing well.  Dyspnea remains at baseline.  He has no further cardiovascular symptoms or issues.    2.  Stress and echo studies from just the last year are as outlined above, but in short were very much unremarkable.  Echo indicated now normalized systolic function.  Stress indicates no evidence of ischemia.  I do not feel further work-up is warranted as the patient is doing well.    3.  I would continue medical regimen without change.    4.  He is scheduled to have laboratories with his primary care provider soon.  We will await results of that and discussed with him as available.    5.  For change in clinical course she will return immediately.  Nothing further for now.  We will continue to see him routinely through the clinic.       Patient did not bring med list or medicine bottles to appointment, med list has been reviewed and updated based on patient's knowledge of their meds.         Electronically signed by:

## 2023-03-23 DIAGNOSIS — I25.5 ISCHEMIC CARDIOMYOPATHY: ICD-10-CM

## 2023-03-23 DIAGNOSIS — E78.2 MIXED HYPERLIPIDEMIA: ICD-10-CM

## 2023-03-23 DIAGNOSIS — I47.20 V-TACH: ICD-10-CM

## 2023-03-23 RX ORDER — LOSARTAN POTASSIUM 25 MG/1
TABLET ORAL
Qty: 90 TABLET | Refills: 3 | Status: SHIPPED | OUTPATIENT
Start: 2023-03-23

## 2023-03-23 RX ORDER — METOPROLOL SUCCINATE 25 MG/1
TABLET, EXTENDED RELEASE ORAL
Qty: 90 TABLET | Refills: 3 | Status: SHIPPED | OUTPATIENT
Start: 2023-03-23

## 2023-03-23 RX ORDER — ASPIRIN 81 MG/1
TABLET, COATED ORAL
Qty: 90 TABLET | Refills: 3 | Status: SHIPPED | OUTPATIENT
Start: 2023-03-23

## 2023-03-23 RX ORDER — ROSUVASTATIN CALCIUM 40 MG/1
TABLET, COATED ORAL
Qty: 90 TABLET | Refills: 3 | Status: SHIPPED | OUTPATIENT
Start: 2023-03-23

## 2023-03-23 NOTE — TELEPHONE ENCOUNTER
Name from pharmacy: LOSARTAN POT 25 MG 25 Tablet          Will file in chart as: losartan (COZAAR) 25 MG tablet    Sig: TAKE ONE TABLET BY MOUTH EVERY DAY    Disp:  30 tablet    Refills:  5    Start: 3/23/2023    Class: Normal    For: Ischemic cardiomyopathy    Last ordered: 4 months ago by Elder Ba MD Last refill: 3/23/2023    Rx #: [id number]    ARB Protocol Failed 03/23/2023 12:21 PM   Protocol Details  Normal serum potassium on file within the past year    GFR > 30 in past year    BP under 130/80 in past year and patient has diabetes, CAD or PVD    Patient is not on Entresto    Patient is not on concurrent ACE    Visit with authorizing provider in past 12 months or upcoming 30 days       Name from pharmacy: ROSUVASTATIN CALCIUM 40 MG 40 Tablet         Will file in chart as: rosuvastatin (CRESTOR) 40 MG tablet    Sig: TAKE ONE TABLET BY MOUTH EVERY EVENING    Disp:  30 tablet    Refills:  5    Start: 3/23/2023    Class: Normal    For: Mixed hyperlipidemia    Last ordered: 4 months ago by Elder Ba MD Last refill: 3/23/2023    Rx #: [id number]    HMG-CoA Reductase Inhibitors (Statins) Protocol Failed 03/23/2023 12:21 PM   Protocol Details  Lipid panel in past 12 months    ALK Phos in past 12 months    ALT in past 12 months    AST in past 12 months    Recent or future visit with authorizing provider

## 2024-01-15 RX ORDER — PRASUGREL 10 MG/1
TABLET, FILM COATED ORAL
Qty: 30 TABLET | Refills: 11 | Status: SHIPPED | OUTPATIENT
Start: 2024-01-15

## 2024-03-18 ENCOUNTER — OFFICE VISIT (OUTPATIENT)
Dept: CARDIOLOGY | Facility: CLINIC | Age: 44
End: 2024-03-18
Payer: COMMERCIAL

## 2024-03-18 VITALS
OXYGEN SATURATION: 99 % | HEIGHT: 70 IN | BODY MASS INDEX: 24.94 KG/M2 | HEART RATE: 65 BPM | DIASTOLIC BLOOD PRESSURE: 62 MMHG | WEIGHT: 174.2 LBS | SYSTOLIC BLOOD PRESSURE: 125 MMHG

## 2024-03-18 DIAGNOSIS — I25.10 CORONARY ARTERY DISEASE INVOLVING NATIVE CORONARY ARTERY OF NATIVE HEART WITHOUT ANGINA PECTORIS: Primary | ICD-10-CM

## 2024-03-18 DIAGNOSIS — R06.02 SHORTNESS OF BREATH: ICD-10-CM

## 2024-03-18 DIAGNOSIS — I10 PRIMARY HYPERTENSION: ICD-10-CM

## 2024-03-18 PROCEDURE — 99213 OFFICE O/P EST LOW 20 MIN: CPT | Performed by: PHYSICIAN ASSISTANT

## 2024-03-18 PROCEDURE — 93000 ELECTROCARDIOGRAM COMPLETE: CPT | Performed by: PHYSICIAN ASSISTANT

## 2024-03-18 NOTE — PROGRESS NOTES
Subjective   Hilario Reich is a 43 y.o. male     Chief Complaint   Patient presents with    Follow-up     Yearly follow up     Palpitations   Problem list:  1.  Coronary artery disease.  1.1.  ST elevation MI with subsequent thrombectomy and stenting to the RCA, 8/2021.  1.2.  Low risk stress test, 12/21, with no evidence of ischemia.  2.  Ischemic cardiomyopathy at time of infarct as above, EF 40 to 45%.  2.1.  Repeat echocardiogram, 3/2022, supported an EF of 50 to 55%.  3.  V. tach during ST elevation MI requiring lidocaine, but no residual dysrhythmic or recurrent dysrhythmic activity is noted after discharge.  4.  Hypertension  5.  Dyslipidemia  6.  History of smoking habituation.    HPI  The patient presents in the clinic today for routine evaluation follow-up.  Previous cardiovascular history is as outlined above.  For the most part, the patient is done very well from cardiovascular standpoint since last appointment.  He currently denies any angina.  He has stable dyspnea.  He has no failure nor dysrhythmic symptoms.  He typically is normotensive when monitored at home.  He tolerates medications without complications.  He had laboratories approximately 2 weeks ago, for which we are attempting to obtain.  Exercise capacity is adequate and without limitation from cardiovascular standpoint.  He has no further complaints otherwise and feels that he is doing well.      Current Outpatient Medications   Medication Sig Dispense Refill    Aspirin Low Dose 81 MG EC tablet TAKE ONE TABLET BY MOUTH EVERY DAY 90 tablet 3    buprenorphine-naloxone (SUBOXONE) 8-2 MG per SL tablet Place 1 tablet under the tongue Daily.      busPIRone (BUSPAR) 5 MG tablet Take 1 tablet by mouth 2 (Two) Times a Day. Use as needed      DULoxetine (CYMBALTA) 20 MG capsule Take 1 capsule by mouth Daily.      losartan (COZAAR) 25 MG tablet TAKE ONE TABLET BY MOUTH EVERY DAY 90 tablet 3    metoprolol succinate XL (TOPROL-XL) 25 MG 24 hr tablet TAKE  ONE TABLET BY MOUTH EVERY DAY (Patient taking differently: Take 0.5 tablets by mouth Daily.) 90 tablet 3    nitroglycerin (NITROSTAT) 0.4 MG SL tablet prn      prasugrel (EFFIENT) 10 MG tablet TAKE ONE TABLET BY MOUTH EVERY DAY (DISCONTINUE BRILINTA) 30 tablet 11    rosuvastatin (CRESTOR) 40 MG tablet TAKE ONE TABLET BY MOUTH EVERY EVENING 90 tablet 3     No current facility-administered medications for this visit.       Patient has no known allergies.    Past Medical History:   Diagnosis Date    CAD (coronary artery disease)     Chest pain     Congenital heart disease     Former smoker     Hyperlipidemia     Hypertension N/a    STEMI (ST elevation myocardial infarction)     STEMI     V-tach        Social History     Socioeconomic History    Marital status:    Tobacco Use    Smoking status: Former     Current packs/day: 0.00     Average packs/day: 1.5 packs/day for 10.0 years (15.0 ttl pk-yrs)     Types: Cigarettes     Quit date: 2021     Years since quittin.5    Smokeless tobacco: Current    Tobacco comments:     used to smoke approx. 1.5 PPD for approx. 15-20 yrs.    Substance and Sexual Activity    Alcohol use: Not Currently     Comment: in past    Drug use: Never    Sexual activity: Yes     Partners: Female     Birth control/protection: I.U.D.       Family History   Problem Relation Age of Onset    Hypothyroidism Mother     Hypertension Father     Hyperlipidemia Father     Heart disease Paternal Grandfather        Review of Systems   Constitutional: Negative.  Negative for chills, diaphoresis, fatigue and fever.   HENT: Negative.     Eyes: Negative.  Negative for visual disturbance.   Respiratory: Negative.  Negative for apnea, cough, chest tightness, shortness of breath and wheezing.    Cardiovascular: Negative.  Positive for palpitations. Negative for chest pain and leg swelling.   Gastrointestinal: Negative.  Negative for abdominal pain and blood in stool.   Endocrine: Negative.   "  Genitourinary: Negative.  Negative for hematuria.   Musculoskeletal:  Positive for arthralgias, back pain, myalgias, neck pain and neck stiffness.   Skin: Negative.  Negative for rash and wound.   Allergic/Immunologic: Negative.  Negative for environmental allergies and food allergies.   Neurological:  Positive for light-headedness and numbness (fingers). Negative for dizziness, syncope, weakness and headaches.   Hematological: Negative.  Bruises/bleeds easily (bruises easily).   Psychiatric/Behavioral:  Negative for sleep disturbance.        Objective     Vitals:    03/18/24 1119   BP: 125/62   Pulse: 65   SpO2: 99%   Weight: 79 kg (174 lb 3.2 oz)   Height: 177.8 cm (70\")        /62   Pulse 65   Ht 177.8 cm (70\")   Wt 79 kg (174 lb 3.2 oz)   SpO2 99%   BMI 25.00 kg/m²      Lab Results (most recent)       None            Physical Exam  Vitals and nursing note reviewed.   Constitutional:       General: He is not in acute distress.     Appearance: He is well-developed.   HENT:      Head: Normocephalic and atraumatic.   Eyes:      Conjunctiva/sclera: Conjunctivae normal.      Pupils: Pupils are equal, round, and reactive to light.   Neck:      Vascular: No JVD.      Trachea: No tracheal deviation.   Cardiovascular:      Rate and Rhythm: Normal rate and regular rhythm.      Heart sounds: Normal heart sounds.   Pulmonary:      Effort: Pulmonary effort is normal.      Breath sounds: Normal breath sounds.   Abdominal:      General: Bowel sounds are normal. There is no distension.      Palpations: Abdomen is soft. There is no mass.      Tenderness: There is no abdominal tenderness. There is no guarding or rebound.   Musculoskeletal:         General: No tenderness or deformity. Normal range of motion.      Cervical back: Normal range of motion and neck supple.   Skin:     General: Skin is warm and dry.      Coloration: Skin is not pale.      Findings: No erythema or rash.   Neurological:      Mental Status: He " is alert and oriented to person, place, and time.   Psychiatric:         Behavior: Behavior normal.         Thought Content: Thought content normal.         Judgment: Judgment normal.         Procedure     ECG 12 Lead    Date/Time: 3/18/2024 11:23 AM  Performed by: Sabas Reynolds PA    Authorized by: Sabas Reynolds PA  Comparison: compared with previous ECG from 3/15/2023  Comparison to previous ECG: Sinus rhythm, rate 57, normal axis, no acute changes noted.               Assessment & Plan      Diagnosis Plan   1. Coronary artery disease involving native coronary artery of native heart without angina pectoris        2. Shortness of breath        3. Primary hypertension          1.  At this time, the patient is doing well.  He is asymptomatic of coronary artery disease.  Dyspnea has actually minimized since last evaluation.  He denies failure nor dysrhythmic issues otherwise.    2.  Most recent echo supported significant improvement in systolic function, now with an EF of 55 to 60%.  Cardiac parameters were stable otherwise.  I have reviewed this in detail with him.    3.  I feel he is on appropriate medications.  I would make no adjustments.    4.  We have discussed updating stress and echo studies.  He feels well enough that he would like to hold on that but is open to discussing this at follow-up.  In this setting, nothing further and we will continue to see him on 6 to 12-month intervals, sooner for complications.           Hilario Reich  reports that he quit smoking about 2 years ago. His smoking use included cigarettes. He has a 15 pack-year smoking history. He uses smokeless tobacco. I have educated him on the risk of diseases from using tobacco products such as cancer, COPD, and heart disease.     I advised him to quit and he is not willing to quit.    I spent 3  minutes counseling the patient.                     Electronically signed by:

## 2024-04-12 DIAGNOSIS — E78.2 MIXED HYPERLIPIDEMIA: ICD-10-CM

## 2024-04-12 DIAGNOSIS — I25.5 ISCHEMIC CARDIOMYOPATHY: ICD-10-CM

## 2024-04-12 DIAGNOSIS — I47.20 V-TACH: ICD-10-CM

## 2024-04-15 RX ORDER — ASPIRIN 81 MG/1
TABLET, COATED ORAL
Qty: 30 TABLET | Refills: 11 | Status: SHIPPED | OUTPATIENT
Start: 2024-04-15

## 2024-04-15 RX ORDER — ROSUVASTATIN CALCIUM 40 MG/1
TABLET, COATED ORAL
Qty: 30 TABLET | Refills: 11 | Status: SHIPPED | OUTPATIENT
Start: 2024-04-15

## 2024-04-15 RX ORDER — LOSARTAN POTASSIUM 25 MG/1
TABLET ORAL
Qty: 30 TABLET | Refills: 11 | Status: SHIPPED | OUTPATIENT
Start: 2024-04-15

## 2024-04-15 RX ORDER — METOPROLOL SUCCINATE 25 MG/1
TABLET, EXTENDED RELEASE ORAL
Qty: 30 TABLET | Refills: 11 | Status: SHIPPED | OUTPATIENT
Start: 2024-04-15

## 2024-06-18 ENCOUNTER — TELEPHONE (OUTPATIENT)
Dept: CARDIOLOGY | Facility: CLINIC | Age: 44
End: 2024-06-18
Payer: COMMERCIAL

## 2024-06-18 NOTE — TELEPHONE ENCOUNTER
Received cardiac clearance request from DR GERA GLORIA stating pt has 4 DENTAL EXTRACTIONS scheduled for 08/19/2024 and is requiring a cardiac clearance. Placed cardiac clearance request in [facility]ANDRAK[facility]' inbox to review and address with provider.

## 2024-10-04 DIAGNOSIS — I47.20 V-TACH: ICD-10-CM

## 2024-10-04 DIAGNOSIS — E78.2 MIXED HYPERLIPIDEMIA: ICD-10-CM

## 2024-10-04 DIAGNOSIS — I25.5 ISCHEMIC CARDIOMYOPATHY: ICD-10-CM

## 2024-10-04 RX ORDER — ASPIRIN 81 MG/1
81 TABLET ORAL DAILY
Qty: 90 TABLET | Refills: 3 | Status: SHIPPED | OUTPATIENT
Start: 2024-10-04

## 2024-10-04 RX ORDER — PRASUGREL 10 MG/1
10 TABLET, FILM COATED ORAL DAILY
Qty: 90 TABLET | Refills: 3 | Status: SHIPPED | OUTPATIENT
Start: 2024-10-04

## 2024-10-04 RX ORDER — METOPROLOL SUCCINATE 25 MG/1
25 TABLET, EXTENDED RELEASE ORAL DAILY
Qty: 90 TABLET | Refills: 3 | Status: SHIPPED | OUTPATIENT
Start: 2024-10-04

## 2024-10-04 RX ORDER — LOSARTAN POTASSIUM 25 MG/1
25 TABLET ORAL DAILY
Qty: 90 TABLET | Refills: 3 | Status: SHIPPED | OUTPATIENT
Start: 2024-10-04

## 2024-10-04 RX ORDER — ROSUVASTATIN CALCIUM 40 MG/1
40 TABLET, COATED ORAL EVERY EVENING
Qty: 90 TABLET | Refills: 3 | Status: SHIPPED | OUTPATIENT
Start: 2024-10-04

## 2025-03-18 ENCOUNTER — OFFICE VISIT (OUTPATIENT)
Dept: CARDIOLOGY | Facility: CLINIC | Age: 45
End: 2025-03-18
Payer: COMMERCIAL

## 2025-03-18 VITALS
WEIGHT: 170 LBS | BODY MASS INDEX: 24.34 KG/M2 | HEIGHT: 70 IN | HEART RATE: 62 BPM | DIASTOLIC BLOOD PRESSURE: 76 MMHG | SYSTOLIC BLOOD PRESSURE: 114 MMHG | OXYGEN SATURATION: 96 %

## 2025-03-18 DIAGNOSIS — I25.10 CORONARY ARTERY DISEASE INVOLVING NATIVE CORONARY ARTERY OF NATIVE HEART WITHOUT ANGINA PECTORIS: Primary | ICD-10-CM

## 2025-03-18 DIAGNOSIS — R06.02 SHORTNESS OF BREATH: ICD-10-CM

## 2025-03-18 DIAGNOSIS — I10 PRIMARY HYPERTENSION: ICD-10-CM

## 2025-03-18 PROCEDURE — 99214 OFFICE O/P EST MOD 30 MIN: CPT | Performed by: PHYSICIAN ASSISTANT

## 2025-03-18 PROCEDURE — 93000 ELECTROCARDIOGRAM COMPLETE: CPT | Performed by: PHYSICIAN ASSISTANT

## 2025-03-18 RX ORDER — CITALOPRAM HYDROBROMIDE 20 MG/1
30 TABLET ORAL EVERY MORNING
COMMUNITY
Start: 2025-03-05

## 2025-03-18 NOTE — PROGRESS NOTES
Problem list     Subjective   Hilario Reich is a 44 y.o. male     Chief Complaint   Patient presents with   • Follow-up     Yearly follow up    • Palpitations   • Coronary Artery Disease   Problem list:  1.  Coronary artery disease.  1.1.  ST elevation MI with subsequent thrombectomy and stenting to the RCA, 8/2021.  1.2.  Low risk stress test, 12/21, with no evidence of ischemia.  2.  Ischemic cardiomyopathy at time of infarct as above, EF 40 to 45%.  2.1.  Repeat echocardiogram, 3/2022, supported an EF of 50 to 55%.  3.  V. tach during ST elevation MI requiring lidocaine, but no residual dysrhythmic or recurrent dysrhythmic activity is noted after discharge.  4.  Hypertension  5.  Dyslipidemia  6.  History of smoking habituation.    HPI  The patient presents in the clinic today for yearly follow-up.  He has cardiac history as as outlined above.  He has done fairly well over the past year.  He notes a degree of palpitations at times.  He also notes a degree of dyspnea which appears somewhat worse when compared to last year.  He really denies chest pain.  He has no PND nor orthopnea.  He has no sustained dysrhythmic activity.  Per report, he tolerates medications without complication.  Also, he reports that he typically is normotensive.  He has no further complaints.    Current Outpatient Medications on File Prior to Visit   Medication Sig Dispense Refill   • aspirin (Aspirin Low Dose) 81 MG EC tablet Take 1 tablet by mouth Daily. 90 tablet 3   • buprenorphine-naloxone (SUBOXONE) 8-2 MG per SL tablet Place 1 tablet under the tongue Daily.     • citalopram (CeleXA) 20 MG tablet Take 1.5 tablets by mouth Every Morning.     • losartan (COZAAR) 25 MG tablet Take 1 tablet by mouth Daily. 90 tablet 3   • metoprolol succinate XL (TOPROL-XL) 25 MG 24 hr tablet Take 1 tablet by mouth Daily. (Patient taking differently: Take 0.5 tablets by mouth Daily.) 90 tablet 3   • nitroglycerin (NITROSTAT) 0.4 MG SL tablet prn     •  prasugrel (EFFIENT) 10 MG tablet Take 1 tablet by mouth Daily. 90 tablet 3   • rosuvastatin (CRESTOR) 40 MG tablet Take 1 tablet by mouth Every Evening. 90 tablet 3   • busPIRone (BUSPAR) 5 MG tablet Take 1 tablet by mouth 2 (Two) Times a Day. Use as needed (Patient not taking: Reported on 3/18/2025)     • [DISCONTINUED] DULoxetine (CYMBALTA) 20 MG capsule Take 1 capsule by mouth Daily.       No current facility-administered medications on file prior to visit.       Patient has no known allergies.    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 1.5 PPD for approx. 15-20 yrs.    Substance and Sexual Activity   • Alcohol use: Not Currently     Comment: in past   • Drug use: Never   • Sexual activity: Yes     Partners: Female     Birth control/protection: I.U.D.       Family History   Problem Relation Age of Onset   • Hypothyroidism Mother    • Hypertension Father    • Hyperlipidemia Father    • Heart disease Paternal Grandfather        Review of Systems   Constitutional: Negative.  Negative for chills, diaphoresis, fatigue and fever.   HENT: Negative.  Negative for hearing loss.    Eyes: Negative.  Negative for visual disturbance.   Respiratory: Negative.  Negative for apnea, cough, chest tightness, shortness of breath and wheezing.    Cardiovascular:  Positive for palpitations. Negative for chest pain and leg swelling.  "  Gastrointestinal: Negative.  Negative for abdominal pain and blood in stool.   Endocrine: Negative.    Genitourinary: Negative.  Negative for hematuria.   Musculoskeletal:  Positive for arthralgias, back pain, myalgias, neck pain and neck stiffness.   Skin: Negative.  Negative for rash and wound.   Allergic/Immunologic: Negative.  Negative for environmental allergies and food allergies.   Neurological:  Positive for light-headedness. Negative for dizziness, syncope, weakness, numbness and headaches.   Hematological:  Bruises/bleeds easily (effient).   Psychiatric/Behavioral: Negative.  Negative for sleep disturbance.        Objective   Vitals:    03/18/25 1119   BP: 114/76   Pulse: 62   SpO2: 96%   Weight: 77.1 kg (170 lb)   Height: 177.8 cm (70\")      /76   Pulse 62   Ht 177.8 cm (70\")   Wt 77.1 kg (170 lb)   SpO2 96%   BMI 24.39 kg/m²    Lab Results (most recent)       None          Physical Exam  Vitals and nursing note reviewed.   Constitutional:       General: He is not in acute distress.     Appearance: He is well-developed.   HENT:      Head: Normocephalic and atraumatic.   Eyes:      Conjunctiva/sclera: Conjunctivae normal.      Pupils: Pupils are equal, round, and reactive to light.   Neck:      Vascular: No JVD.      Trachea: No tracheal deviation.   Cardiovascular:      Rate and Rhythm: Normal rate and regular rhythm.      Heart sounds: Normal heart sounds.   Pulmonary:      Effort: Pulmonary effort is normal.      Breath sounds: Normal breath sounds.   Abdominal:      General: Bowel sounds are normal. There is no distension.      Palpations: Abdomen is soft. There is no mass.      Tenderness: There is no abdominal tenderness. There is no guarding or rebound.   Musculoskeletal:         General: No tenderness or deformity. Normal range of motion.      Cervical back: Normal range of motion and neck supple.   Skin:     General: Skin is warm and dry.      Coloration: Skin is not pale.      " Findings: No erythema or rash.   Neurological:      Mental Status: He is alert and oriented to person, place, and time.   Psychiatric:         Behavior: Behavior normal.         Thought Content: Thought content normal.         Judgment: Judgment normal.         Procedure     ECG 12 Lead    Date/Time: 3/18/2025 11:37 AM  Performed by: Sabas Reynolds PA    Authorized by: Sabas Reynolds PA  Comparison: compared with previous ECG from 3/18/2024  Comparison to previous ECG: Sinus rhythm without acute changes noted.  Right axis deviation noted.  Nonspecific ST and T wave changes present.           Assessment & Plan      Diagnosis Plan   1. Coronary artery disease involving native coronary artery of native heart without angina pectoris  Adult Transthoracic Echo Complete W/ Cont if Necessary Per Protocol    Stress Test With Myocardial Perfusion One Day      2. Shortness of breath  Adult Transthoracic Echo Complete W/ Cont if Necessary Per Protocol    Stress Test With Myocardial Perfusion One Day      3. Primary hypertension  Adult Transthoracic Echo Complete W/ Cont if Necessary Per Protocol    Stress Test With Myocardial Perfusion One Day        1.  The patient presents in for routine evaluation and follow-up.  Cardiovascular history is as outlined above.  For the most part, the patient has done fairly well, but notes some degree of residual symptoms as above.  With cardiac history, I would like to repeat cardiac evaluation as he has not had any testing for almost 3 years.    2.  We will schedule for nuclear stress test for ischemia assessment.    3.  I would also schedule for an echo to evaluate cardiac parameters.  We need to reevaluate systolic function in particular.    4.  He is on appropriate medical regimen which I would not adjust.    5.  If stress and echo studies are benign and he continues to do overall well, nothing further and we would see him on 6 to 12-month intervals.           Hilario Reich  reports  that he quit smoking about 3 years ago. His smoking use included cigarettes. He has a 15 pack-year smoking history. His smokeless tobacco use includes snuff. I have educated him on the risk of diseases from using tobacco products such as cancer, COPD, and heart disease.     I advised him to quit and he is not willing to quit.    I spent 3  minutes counseling the patient.                       Electronically signed by: